# Patient Record
Sex: MALE | Race: BLACK OR AFRICAN AMERICAN | URBAN - METROPOLITAN AREA
[De-identification: names, ages, dates, MRNs, and addresses within clinical notes are randomized per-mention and may not be internally consistent; named-entity substitution may affect disease eponyms.]

---

## 2020-06-22 ENCOUNTER — OFFICE VISIT (OUTPATIENT)
Dept: PRIMARY CARE CLINIC | Age: 55
End: 2020-06-22

## 2020-06-22 PROCEDURE — 99211 OFF/OP EST MAY X REQ PHY/QHP: CPT | Performed by: INTERNAL MEDICINE

## 2020-06-22 NOTE — PROGRESS NOTES
6/22/2020    FLU/COVID-19 CLINIC EVALUATION    HPI  SYMPTOMS:    Symptom duration, days:  [] 1   [] 2   [] 3   [] 4   [] 5   [] 6   [] 7   [] 8   [] 9   [] 10   [] 11   [] 12   [] 13 [] 14 +      Symptom course:   [] Worsening     [] Stable     [] Improving    [] Fevers  [] Symptom (not measured)  [] Measured (Result: )  [] Chills  [] Cough  [] Coughing up blood  [] Productive  [] Dry  [] Chest Congestion  [] Chest Tightness  [] Nasal Congestion  [] Runny Nose  [] Feeling short of breath  [] Fatigue  [] Chest pain  [] Headaches  []Tolerable  [] Severe  [] Sore throat  [] Muscle aches  [] Nausea  [] Decreased appetite  [] Vomiting  []Unable to keep fluids down  [] Diarrhea  []Severe    [] OTHER SYMPTOMS:      RISK FACTORS:  [] Pregnant or possibly pregnant  [] Age over 60  [] Diabetes  [] Heart disease  [] Asthma  [] COPD/Other chronic lung diseases  [] Active Cancer  [] On Chemotherapy  [] Taking oral steroids  [] History Lymphoma/Leukemia  [] Close contact with a lab confirmed COVID-19 patient within 14 days of symptom onset  [] History of travel from affected geographical areas within 14 days of symptom onset  [] Health Care Worker Exposure no symptoms  [] Health Care Worker Exposure symptomatic      VITALS:  There were no vitals filed for this visit.      PHYSICAL EXAMINATION:  [x]Alert   [x]Oriented to person/place/time    [x]No apparent distress     []Toxic appearing    [x]Breathing appears normal     []Appears tachypneic         [x]Speaking in full sentences     TESTS:  POCT FLU:  [] Positive     []Negative  POCT STREP:  [] Positive     []Negative    [x] COVID-19 Test sent: [x] Blue   [] Red   [] Chest X-ray     ASSESSMENT:  [] Flu  [] Strep Throat  [] Uncertain Viral Respiratory Illness  [] Possible COVID-19  [x] Exposure COVID-19  [] Other:     PLAN:  [x] Discharge home with written instructions for:  [] Flu management  [] Strep throat management  [x] Possible COVID-19 exposure with self-quarantine   []

## 2020-06-26 LAB
SARS-COV-2: NOT DETECTED
SOURCE: NORMAL